# Patient Record
Sex: FEMALE | Race: WHITE | NOT HISPANIC OR LATINO | URBAN - METROPOLITAN AREA
[De-identification: names, ages, dates, MRNs, and addresses within clinical notes are randomized per-mention and may not be internally consistent; named-entity substitution may affect disease eponyms.]

---

## 2018-06-25 ENCOUNTER — TELEPHONE (OUTPATIENT)
Dept: URGENT CARE | Facility: CLINIC | Age: 18
End: 2018-06-25

## 2018-06-25 NOTE — TELEPHONE ENCOUNTER
Pt called w/ colorectal surgical appointment information; appointment made @ [facility], [address] [phone] for Wednesday 6/27 @ 9:30 w/ Dr. Kennedy.  Records faxed to office @ [phone].